# Patient Record
Sex: MALE | Race: BLACK OR AFRICAN AMERICAN | NOT HISPANIC OR LATINO | Employment: OTHER | URBAN - METROPOLITAN AREA
[De-identification: names, ages, dates, MRNs, and addresses within clinical notes are randomized per-mention and may not be internally consistent; named-entity substitution may affect disease eponyms.]

---

## 2018-03-21 PROBLEM — R21 LOCALIZED MACULAR RASH: Status: ACTIVE | Noted: 2018-03-21

## 2018-06-14 ENCOUNTER — TELEPHONE (OUTPATIENT)
Dept: ADMINISTRATIVE | Facility: HOSPITAL | Age: 68
End: 2018-06-14

## 2019-07-15 ENCOUNTER — PATIENT OUTREACH (OUTPATIENT)
Dept: ADMINISTRATIVE | Facility: HOSPITAL | Age: 69
End: 2019-07-15

## 2019-07-16 ENCOUNTER — TELEPHONE (OUTPATIENT)
Dept: ADMINISTRATIVE | Facility: HOSPITAL | Age: 69
End: 2019-07-16

## 2020-06-24 PROBLEM — H25.13 NUCLEAR SCLEROSIS, BILATERAL: Status: ACTIVE | Noted: 2020-06-24

## 2020-07-27 PROBLEM — U07.1 COVID-19: Status: ACTIVE | Noted: 2020-07-27

## 2020-07-27 PROBLEM — N17.9 AKI (ACUTE KIDNEY INJURY): Status: ACTIVE | Noted: 2020-07-27

## 2020-07-28 ENCOUNTER — NURSE TRIAGE (OUTPATIENT)
Dept: ADMINISTRATIVE | Facility: CLINIC | Age: 70
End: 2020-07-28

## 2020-07-28 NOTE — TELEPHONE ENCOUNTER
Reason for Disposition   General information question, no triage required and triager able to answer question    Protocols used: INFORMATION ONLY CALL-A-AH

## 2020-07-28 NOTE — TELEPHONE ENCOUNTER
2nd attempt  Spoke with Camille Zheng (pt's significant other) and she verified pt's identity by spelling pt's first and last names and verifying pt's . Camille states that pt is doing better denies SOB or need for medical intervention or emergency services. She states that pt's only symptom is his cough. Camille states that she and pt live in their home alone and neither one has a smart phone or knows how to access technology. Camille states that neither herself not pt knows how to receive or send a text message. Camille denies any active needs. Advised Camille to call OOC anytime for non-emergent needs and to always call 911 for emergencies; Camille voiced verbal understanding and agrees with advice. Will secure chat RITO to unenroll pt. Encounter routed to RITO and PCP.

## 2020-07-28 NOTE — TELEPHONE ENCOUNTER
Reason for Disposition   Message left on unidentified voice mail. Phone number verified.    Protocols used: NO CONTACT OR DUPLICATE CONTACT CALL-A-OH

## 2020-07-28 NOTE — TELEPHONE ENCOUNTER
Attempt 1  Attempted to reach pt enrolled in the COVID-19 Home Surveillance program to complete welcome call and orientation process via all available numbers in pt chart without success. Voice message left with specific instructions to return call at contact information provided if needing to speak to an RN for any non-emergent matters and to call 911 for any emergencies. Pt doesn't have a MyChart; unable to push messaging to pt.

## 2022-01-01 ENCOUNTER — LAB VISIT (OUTPATIENT)
Dept: LAB | Facility: HOSPITAL | Age: 72
End: 2022-01-01
Attending: NURSE PRACTITIONER
Payer: MEDICARE

## 2022-01-01 ENCOUNTER — PATIENT OUTREACH (OUTPATIENT)
Dept: ADMINISTRATIVE | Facility: CLINIC | Age: 72
End: 2022-01-01
Payer: MEDICARE

## 2022-01-01 ENCOUNTER — PATIENT MESSAGE (OUTPATIENT)
Dept: ADMINISTRATIVE | Facility: CLINIC | Age: 72
End: 2022-01-01
Payer: MEDICARE

## 2022-01-01 ENCOUNTER — HOSPITAL ENCOUNTER (EMERGENCY)
Facility: HOSPITAL | Age: 72
Discharge: HOME | End: 2022-07-08
Attending: FAMILY MEDICINE
Payer: MEDICARE

## 2022-01-01 ENCOUNTER — NURSE TRIAGE (OUTPATIENT)
Dept: ADMINISTRATIVE | Facility: CLINIC | Age: 72
End: 2022-01-01
Payer: MEDICARE

## 2022-01-01 VITALS
TEMPERATURE: 99 F | RESPIRATION RATE: 18 BRPM | WEIGHT: 136 LBS | HEART RATE: 102 BPM | SYSTOLIC BLOOD PRESSURE: 132 MMHG | DIASTOLIC BLOOD PRESSURE: 70 MMHG | BODY MASS INDEX: 16.56 KG/M2 | HEIGHT: 76 IN | OXYGEN SATURATION: 98 %

## 2022-01-01 DIAGNOSIS — I10 ESSENTIAL HYPERTENSION: ICD-10-CM

## 2022-01-01 DIAGNOSIS — R74.01 TRANSAMINITIS: ICD-10-CM

## 2022-01-01 DIAGNOSIS — N18.30 TYPE 2 DM WITH CKD STAGE 3 AND HYPERTENSION: ICD-10-CM

## 2022-01-01 DIAGNOSIS — Z79.4 TYPE 2 DIABETES MELLITUS WITH DIABETIC POLYNEUROPATHY, WITH LONG-TERM CURRENT USE OF INSULIN: ICD-10-CM

## 2022-01-01 DIAGNOSIS — R05.9 COUGH: ICD-10-CM

## 2022-01-01 DIAGNOSIS — J90 PLEURAL EFFUSION, RIGHT: Primary | ICD-10-CM

## 2022-01-01 DIAGNOSIS — E11.42 TYPE 2 DIABETES MELLITUS WITH DIABETIC POLYNEUROPATHY, WITH LONG-TERM CURRENT USE OF INSULIN: ICD-10-CM

## 2022-01-01 DIAGNOSIS — E11.22 TYPE 2 DM WITH CKD STAGE 3 AND HYPERTENSION: ICD-10-CM

## 2022-01-01 DIAGNOSIS — I12.9 TYPE 2 DM WITH CKD STAGE 3 AND HYPERTENSION: ICD-10-CM

## 2022-01-01 LAB
ALBUMIN SERPL BCP-MCNC: 4.2 G/DL (ref 3.5–5.2)
ALBUMIN SERPL BCP-MCNC: 4.3 G/DL (ref 3.5–5.2)
ALP SERPL-CCNC: 257 U/L (ref 38–126)
ALP SERPL-CCNC: 77 U/L (ref 38–126)
ALT SERPL W/O P-5'-P-CCNC: 205 U/L (ref 10–44)
ALT SERPL W/O P-5'-P-CCNC: 34 U/L (ref 10–44)
ANION GAP SERPL CALC-SCNC: 12 MMOL/L (ref 8–16)
ANION GAP SERPL CALC-SCNC: 9 MMOL/L (ref 8–16)
AST SERPL-CCNC: 176 U/L (ref 15–46)
AST SERPL-CCNC: 34 U/L (ref 15–46)
BACTERIA BLD CULT: NORMAL
BACTERIA BLD CULT: NORMAL
BASOPHILS # BLD AUTO: 0.08 K/UL (ref 0–0.2)
BASOPHILS NFR BLD: 1 % (ref 0–1.9)
BILIRUB SERPL-MCNC: 0.5 MG/DL (ref 0.1–1)
BILIRUB SERPL-MCNC: 0.7 MG/DL (ref 0.1–1)
CALCIUM SERPL-MCNC: 9.3 MG/DL (ref 8.7–10.5)
CALCIUM SERPL-MCNC: 9.4 MG/DL (ref 8.7–10.5)
CHLORIDE SERPL-SCNC: 103 MMOL/L (ref 95–110)
CHLORIDE SERPL-SCNC: 107 MMOL/L (ref 95–110)
CO2 SERPL-SCNC: 24 MMOL/L (ref 23–29)
CO2 SERPL-SCNC: 26 MMOL/L (ref 23–29)
CREAT SERPL-MCNC: 1.26 MG/DL (ref 0.5–1.4)
CREAT SERPL-MCNC: 1.26 MG/DL (ref 0.5–1.4)
DIFFERENTIAL METHOD: ABNORMAL
EOSINOPHIL # BLD AUTO: 0.2 K/UL (ref 0–0.5)
EOSINOPHIL NFR BLD: 2 % (ref 0–8)
ERYTHROCYTE [DISTWIDTH] IN BLOOD BY AUTOMATED COUNT: 13 % (ref 11.5–14.5)
EST. GFR  (AFRICAN AMERICAN): >60 ML/MIN/1.73 M^2
EST. GFR  (AFRICAN AMERICAN): >60 ML/MIN/1.73 M^2
EST. GFR  (NON AFRICAN AMERICAN): 57.4 ML/MIN/1.73 M^2
EST. GFR  (NON AFRICAN AMERICAN): 57.4 ML/MIN/1.73 M^2
ESTIMATED AVG GLUCOSE: 217 MG/DL (ref 68–131)
GLUCOSE SERPL-MCNC: 138 MG/DL (ref 70–110)
GLUCOSE SERPL-MCNC: 192 MG/DL (ref 70–110)
HAV IGM SERPL QL IA: NEGATIVE
HBA1C MFR BLD: 9.2 % (ref 4–5.6)
HBV CORE IGM SERPL QL IA: NEGATIVE
HBV SURFACE AG SERPL QL IA: NEGATIVE
HCT VFR BLD AUTO: 34.8 % (ref 40–54)
HCV AB SERPL QL IA: NEGATIVE
HGB BLD-MCNC: 10.7 G/DL (ref 14–18)
IMM GRANULOCYTES # BLD AUTO: 0.03 K/UL (ref 0–0.04)
IMM GRANULOCYTES NFR BLD AUTO: 0.4 % (ref 0–0.5)
INFLUENZA A, MOLECULAR: NEGATIVE
INFLUENZA B, MOLECULAR: NEGATIVE
LYMPHOCYTES # BLD AUTO: 1.2 K/UL (ref 1–4.8)
LYMPHOCYTES NFR BLD: 15 % (ref 18–48)
MCH RBC QN AUTO: 27.5 PG (ref 27–31)
MCHC RBC AUTO-ENTMCNC: 30.7 G/DL (ref 32–36)
MCV RBC AUTO: 90 FL (ref 82–98)
MONOCYTES # BLD AUTO: 0.6 K/UL (ref 0.3–1)
MONOCYTES NFR BLD: 7.6 % (ref 4–15)
NEUTROPHILS # BLD AUTO: 5.8 K/UL (ref 1.8–7.7)
NEUTROPHILS NFR BLD: 74 % (ref 38–73)
NRBC BLD-RTO: 0 /100 WBC
PLATELET # BLD AUTO: 344 K/UL (ref 150–450)
PMV BLD AUTO: 9.8 FL (ref 9.2–12.9)
POTASSIUM SERPL-SCNC: 4.6 MMOL/L (ref 3.5–5.1)
POTASSIUM SERPL-SCNC: 4.8 MMOL/L (ref 3.5–5.1)
PROT SERPL-MCNC: 7.4 G/DL (ref 6–8.4)
PROT SERPL-MCNC: 7.9 G/DL (ref 6–8.4)
RBC # BLD AUTO: 3.89 M/UL (ref 4.6–6.2)
SARS-COV-2 RDRP RESP QL NAA+PROBE: NEGATIVE
SODIUM SERPL-SCNC: 138 MMOL/L (ref 136–145)
SODIUM SERPL-SCNC: 143 MMOL/L (ref 136–145)
SPECIMEN SOURCE: NORMAL
UUN UR-MCNC: 17 MG/DL (ref 2–20)
UUN UR-MCNC: 23 MG/DL (ref 2–20)
WBC # BLD AUTO: 7.81 K/UL (ref 3.9–12.7)

## 2022-01-01 PROCEDURE — 83036 HEMOGLOBIN GLYCOSYLATED A1C: CPT | Performed by: NURSE PRACTITIONER

## 2022-01-01 PROCEDURE — 87502 INFLUENZA DNA AMP PROBE: CPT | Mod: ER | Performed by: FAMILY MEDICINE

## 2022-01-01 PROCEDURE — 85025 COMPLETE CBC W/AUTO DIFF WBC: CPT | Mod: ER | Performed by: FAMILY MEDICINE

## 2022-01-01 PROCEDURE — U0002 COVID-19 LAB TEST NON-CDC: HCPCS | Mod: ER | Performed by: FAMILY MEDICINE

## 2022-01-01 PROCEDURE — 25000003 PHARM REV CODE 250: Mod: ER | Performed by: FAMILY MEDICINE

## 2022-01-01 PROCEDURE — 36415 COLL VENOUS BLD VENIPUNCTURE: CPT | Mod: PO | Performed by: NURSE PRACTITIONER

## 2022-01-01 PROCEDURE — 63600175 PHARM REV CODE 636 W HCPCS: Mod: ER | Performed by: FAMILY MEDICINE

## 2022-01-01 PROCEDURE — 87040 BLOOD CULTURE FOR BACTERIA: CPT | Mod: ER | Performed by: FAMILY MEDICINE

## 2022-01-01 PROCEDURE — 80053 COMPREHEN METABOLIC PANEL: CPT | Mod: PO | Performed by: NURSE PRACTITIONER

## 2022-01-01 PROCEDURE — 96365 THER/PROPH/DIAG IV INF INIT: CPT | Mod: ER

## 2022-01-01 PROCEDURE — 80074 ACUTE HEPATITIS PANEL: CPT | Mod: ER | Performed by: EMERGENCY MEDICINE

## 2022-01-01 PROCEDURE — 80053 COMPREHEN METABOLIC PANEL: CPT | Mod: ER | Performed by: FAMILY MEDICINE

## 2022-01-01 PROCEDURE — 99284 EMERGENCY DEPT VISIT MOD MDM: CPT | Mod: 25,ER

## 2022-01-01 PROCEDURE — 25000003 PHARM REV CODE 250: Mod: ER | Performed by: EMERGENCY MEDICINE

## 2022-01-01 RX ORDER — TRAMADOL HYDROCHLORIDE 50 MG/1
50 TABLET ORAL
Status: COMPLETED | OUTPATIENT
Start: 2022-01-01 | End: 2022-01-01

## 2022-01-01 RX ORDER — TRAMADOL HYDROCHLORIDE 50 MG/1
50 TABLET ORAL EVERY 6 HOURS PRN
Qty: 12 TABLET | Refills: 0 | Status: SHIPPED | OUTPATIENT
Start: 2022-01-01 | End: 2022-01-01

## 2022-01-01 RX ORDER — AZITHROMYCIN 250 MG/1
250 TABLET, FILM COATED ORAL DAILY
Qty: 6 TABLET | Refills: 0 | Status: SHIPPED | OUTPATIENT
Start: 2022-01-01 | End: 2022-01-01

## 2022-01-01 RX ADMIN — TRAMADOL HYDROCHLORIDE 50 MG: 50 TABLET, COATED ORAL at 07:07

## 2022-01-01 RX ADMIN — PIPERACILLIN AND TAZOBACTAM 4.5 G: 4; .5 INJECTION, POWDER, LYOPHILIZED, FOR SOLUTION INTRAVENOUS; PARENTERAL at 07:07

## 2022-07-08 NOTE — ED NOTES
"Pt presents to ED with C/O cough and rib pain with deep breaths. Pt states " I hurt all the time." Spouse reports pt gets SOB with ambulation. Pt states pain is 10/10.  "

## 2022-07-08 NOTE — ED PROVIDER NOTES
"Encounter Date: 2022       History     Chief Complaint   Patient presents with    Cough     Pt reports cough and pain with deep breathing. Spouse reports pt SOB with walking. Symptoms started for "quite some time." Denies chest pain. No meds.      71-year-old male presents to ER with chronic cough more than 3 months.  Also complains of diffuse back pain for last 3 months.  Patient claims he has been losing weight and was seen by his PCP and had CT of lungs recently.  And is pending pulmonary workup.  Patient also had EGD yesterday.  Started on PPI.  History of chronic alcoholism..  Hypertension, hyperlipidemia, diabetes type 2, arthritis.    The history is provided by the patient.     Review of patient's allergies indicates:  No Known Allergies  Past Medical History:   Diagnosis Date    ATIF (acute kidney injury) 2020    Arthritis     Diabetes mellitus, type 2     Erectile dysfunction     Hyperlipidemia     Hypertension     Joint pain      Past Surgical History:   Procedure Laterality Date    CARDIAC CATHETERIZATION      ESOPHAGOGASTRODUODENOSCOPY N/A 2022    Procedure: EGD (ESOPHAGOGASTRODUODENOSCOPY);  Surgeon: Ranjit Cohen MD;  Location: [facility];  Service: Endoscopy;  Laterality: N/A;    SHOULDER SURGERY Right      Family History   Problem Relation Age of Onset    Cancer Mother         throat    No Known Problems Father     No Known Problems Sister     No Known Problems Brother     No Known Problems Sister      Social History     Tobacco Use    Smoking status: Former Smoker     Packs/day: 0.50     Years: 50.00     Pack years: 25.00     Types: Cigarettes     Start date:      Quit date: 2008     Years since quittin.8    Smokeless tobacco: Never Used    Tobacco comment: quit    Substance Use Topics    Alcohol use: Not Currently     Alcohol/week: 1.0 standard drink     Types: 1 Cans of beer per week     Comment: 1 beer occ    Drug use: No     Review of Systems "   Constitutional: Negative for activity change, appetite change, chills and fever.   HENT: Negative for congestion and sore throat.    Eyes: Negative for discharge and itching.   Respiratory: Positive for cough and shortness of breath. Negative for chest tightness and wheezing.    Cardiovascular: Negative for chest pain and leg swelling.   Gastrointestinal: Negative for abdominal pain, nausea and vomiting.   Genitourinary: Negative for dysuria and flank pain.   Musculoskeletal: Negative for back pain.   Skin: Negative for rash.   Neurological: Negative for dizziness, speech difficulty, weakness, light-headedness, numbness and headaches.   Hematological: Does not bruise/bleed easily.   All other systems reviewed and are negative.      Physical Exam     Initial Vitals [07/08/22 1612]   BP Pulse Resp Temp SpO2   112/68 109 17 99.3 °F (37.4 °C) 97 %      MAP       --         Physical Exam    Nursing note and vitals reviewed.  Constitutional: Vital signs are normal. He appears well-developed and well-nourished. He is active. No distress.   HENT:   Head: Normocephalic.   Nose: Nose normal.   Mouth/Throat: Oropharynx is clear and moist and mucous membranes are normal.   Eyes: Conjunctivae, EOM and lids are normal. Pupils are equal, round, and reactive to light.   Neck: Neck supple.   Normal range of motion.  Cardiovascular: Normal rate, regular rhythm, S1 normal, S2 normal and normal heart sounds.   Pulmonary/Chest: No respiratory distress. He has decreased breath sounds in the right lower field. He has no wheezes. He has no rhonchi. He has rales. He exhibits no tenderness.   Abdominal: Abdomen is soft. Bowel sounds are normal. He exhibits no distension. There is no abdominal tenderness. There is no rebound and no guarding.   Musculoskeletal:         General: Normal range of motion.      Right upper arm: Normal.      Left upper arm: Normal.      Cervical back: Normal range of motion and neck supple.      Right lower leg:  Normal.      Left lower leg: Normal.     Neurological: He is alert and oriented to person, place, and time. He has normal strength. GCS score is 15. GCS eye subscore is 4. GCS verbal subscore is 5. GCS motor subscore is 6.   Skin: Skin is warm. Capillary refill takes less than 2 seconds.   Psychiatric: He has a normal mood and affect. His speech is normal and behavior is normal. Thought content normal. Cognition and memory are normal.         ED Course   Procedures  Labs Reviewed   CBC W/ AUTO DIFFERENTIAL - Abnormal; Notable for the following components:       Result Value    RBC 3.89 (*)     Hemoglobin 10.7 (*)     Hematocrit 34.8 (*)     MCHC 30.7 (*)     Gran % 74.0 (*)     Lymph % 15.0 (*)     All other components within normal limits   COMPREHENSIVE METABOLIC PANEL - Abnormal; Notable for the following components:    Glucose 138 (*)     Alkaline Phosphatase 257 (*)      (*)      (*)     eGFR if non  57.4 (*)     All other components within normal limits   INFLUENZA A & B BY MOLECULAR   CULTURE, BLOOD   CULTURE, BLOOD   SARS-COV-2 RNA AMPLIFICATION, QUAL    Narrative:     Is the patient symptomatic?->Yes   HEPATITIS PANEL, ACUTE          Imaging Results          X-Ray Chest PA And Lateral (Final result)  Result time 07/08/22 17:10:49    Final result by Jus Kline MD (07/08/22 17:10:49)                 Impression:      Moderate opacification of the right lung base consistent with right basilar pneumonia with likely associated right-sided pleural effusion      Electronically signed by: Eliud Luu  Date:    07/08/2022  Time:    17:10             Narrative:    EXAMINATION:  XR CHEST PA AND LATERAL    CLINICAL HISTORY:  Cough, unspecified    TECHNIQUE:  PA and lateral views of the chest were performed.    COMPARISON:  None    FINDINGS:  Moderate opacification of right lung base consistent with left basilar pneumonia with associated left basilar pleural effusion.  Calcification of  the pancreas suspected.  No acute osseous injury.  Cardiac silhouette within normal limits.                                 Medications   piperacillin-tazobactam 4.5 g in dextrose 5 % 100 mL IVPB (ready to mix system) (0 g Intravenous Stopped 7/8/22 1957)   traMADoL tablet 50 mg (50 mg Oral Given 7/8/22 1950)     Medical Decision Making:   Initial Assessment:   71-year-old male presents to ER with chronic cough, chronic back pain.  Recent evaluation of pleural effusion-by PCP pending pulmonary appointment.  Denies fever.  Exertion or shortness of breath.  Pleurisy  Differential Diagnosis:   Pleural effusion, benign versus malignant, pneumonia, asbestosis/mesothelioma  Clinical Tests:   Lab Tests: Ordered and Reviewed  Radiological Study: Ordered and Reviewed  Medical Tests: Ordered and Reviewed  ED Management:  Review of CT with right pleural effusion with flakes concern for asbestosis.  X-ray consistent with CT findings.  Pending lab workup patient is turned over to Dr. Medrano at shift change.                      Clinical Impression:   Final diagnoses:  [R05.9] Cough  [J90] Pleural effusion, right (Primary)  [R74.01] Transaminitis          ED Disposition Condition    Discharge Stable        ED Prescriptions     Medication Sig Dispense Start Date End Date Auth. Provider    azithromycin (Z-FALLON) 250 MG tablet Take 1 tablet (250 mg total) by mouth once daily. Take first 2 tablets together, then 1 every day until finished. for 10 days 6 tablet 7/8/2022 7/18/2022 Horace Medrano MD    traMADoL (ULTRAM) 50 mg tablet Take 1 tablet (50 mg total) by mouth every 6 (six) hours as needed for Pain. 12 tablet 7/8/2022 7/11/2022 Horace Medrano MD        Follow-up Information     Follow up With Specialties Details Why Contact Info    Yariel Gudino MD Pulmonary Disease, Critical Care Medicine Go on 7/14/2022 As scheduled, for reassessment [address]  [place] [address]  [phone]      [facility]  Emergency Dept Emergency Medicine Go to  As needed, If symptoms worsen [address] [facility] [address] [phone]    PCP  Schedule an appointment as soon as possible for a visit  reassessment of elevated liver enzymes/consideration for specialty referral            Sushant Browning MD  07/09/22 0600

## 2022-07-09 NOTE — ED PROVIDER NOTES
"The patient was received from the off-going emergency room physician Dr Browning at 6:00PM.  All pertinent details presently available from the patient encounter were discussed along with the expected plan for disposition.        Vitals:    07/08/22 1612 07/08/22 1851 07/08/22 1950 07/08/22 2002   BP: 112/68 131/68  132/70   Pulse: 109 101  102   Resp: 17  18    Temp: 99.3 °F (37.4 °C)      SpO2: 97% 99%  98%   Weight: 61.7 kg (136 lb)      Height: 6' 4" (1.93 m)           Latest Reference Range & Units 07/08/22 16:44 07/08/22 18:43   WBC 3.90 - 12.70 K/uL  7.81   RBC 4.60 - 6.20 M/uL  3.89 (L)   Hemoglobin 14.0 - 18.0 g/dL  10.7 (L)   Hematocrit 40.0 - 54.0 %  34.8 (L)   MCV 82 - 98 fL  90   MCH 27.0 - 31.0 pg  27.5   MCHC 32.0 - 36.0 g/dL  30.7 (L)   RDW 11.5 - 14.5 %  13.0   Platelets 150 - 450 K/uL  344   MPV 9.2 - 12.9 fL  9.8   Gran % 38.0 - 73.0 %  74.0 (H)   Lymph % 18.0 - 48.0 %  15.0 (L)   Mono % 4.0 - 15.0 %  7.6   Eosinophil % 0.0 - 8.0 %  2.0   Basophil % 0.0 - 1.9 %  1.0   Immature Granulocytes 0.0 - 0.5 %  0.4   Gran # (ANC) 1.8 - 7.7 K/uL  5.8   Lymph # 1.0 - 4.8 K/uL  1.2   Mono # 0.3 - 1.0 K/uL  0.6   Eos # 0.0 - 0.5 K/uL  0.2   Baso # 0.00 - 0.20 K/uL  0.08   Immature Grans (Abs) 0.00 - 0.04 K/uL  0.03   nRBC 0 /100 WBC  0   Differential Method   Automated   Sodium 136 - 145 mmol/L  138   Potassium 3.5 - 5.1 mmol/L  4.6   Chloride 95 - 110 mmol/L  103   CO2 23 - 29 mmol/L  26   Anion Gap 8 - 16 mmol/L  9   BUN 2 - 20 mg/dL  17   Creatinine 0.50 - 1.40 mg/dL  1.26   eGFR if non African American >60 mL/min/1.73 m^2  57.4 !   eGFR if African American >60 mL/min/1.73 m^2  >60.0   Glucose 70 - 110 mg/dL  138 (H)   Calcium 8.7 - 10.5 mg/dL  9.3   Alkaline Phosphatase 38 - 126 U/L  257 (H)   PROTEIN TOTAL 6.0 - 8.4 g/dL  7.9   Albumin 3.5 - 5.2 g/dL  4.2   BILIRUBIN TOTAL 0.1 - 1.0 mg/dL  0.7   AST 15 - 46 U/L  176 (H)   ALT 10 - 44 U/L  205 (H)   Flu A & B Source  NP    Influenza A, Molecular Negative  " Negative    Influenza B, Molecular Negative  Negative    SARS-CoV-2 RNA, Amplification, Qual Negative  Negative    (L): Data is abnormally low  (H): Data is abnormally high  !: Data is abnormal        EXAMINATION:  XR CHEST PA AND LATERAL     CLINICAL HISTORY:  Cough, unspecified     TECHNIQUE:  PA and lateral views of the chest were performed.     COMPARISON:  None     FINDINGS:  Moderate opacification of right lung base consistent with left basilar pneumonia with associated left basilar pleural effusion.  Calcification of the pancreas suspected.  No acute osseous injury.  Cardiac silhouette within normal limits.     Impression:     Moderate opacification of the right lung base consistent with right basilar pneumonia with likely associated right-sided pleural effusion        Electronically signed by: Eliud Luu  Date:                                            07/08/2022  Time:                                           17:10      All findings were reviewed with the patient/family in detail.  Patient will be started on a course of empiric antibiotics although I think the likelihood of an infectious source is fairly low.  Patient has a pulmonology appointment scheduled for the 14th.  Patient is not showing any signs of respiratory compromise at present so we will defer thoracentesis until that time.  I see no indication of an emergent process beyond that addressed during our encounter but have duly counseled the patient/family regarding the need for prompt follow-up as well as the indications that should prompt immediate return to the emergency room should new or worrisome developments occur.  The patient has additionally been provided with printed information regarding diagnosis as well as instructions regarding follow up and any medications intended to manage the patient's aforementioned conditions.  The patient/family communicates understanding of all this information and all remaining questions and concerns  were addressed at this time.           Horace Medrano MD  07/08/22 2805

## 2022-08-01 PROBLEM — J90 PLEURAL EFFUSION: Status: ACTIVE | Noted: 2022-01-01

## 2022-08-01 PROBLEM — R91.8 PULMONARY NODULES: Status: ACTIVE | Noted: 2022-01-01

## 2022-08-01 PROBLEM — R79.89 ABNORMAL LFTS: Status: ACTIVE | Noted: 2022-01-01

## 2022-08-01 PROBLEM — D64.9 ANEMIA: Status: ACTIVE | Noted: 2022-01-01

## 2022-08-01 PROBLEM — E46 MALNUTRITION: Status: ACTIVE | Noted: 2022-01-01

## 2022-08-02 PROBLEM — J98.19 TRAPPED LUNG: Status: ACTIVE | Noted: 2022-01-01

## 2022-08-08 PROBLEM — N17.9 AKI (ACUTE KIDNEY INJURY): Status: RESOLVED | Noted: 2020-07-27 | Resolved: 2022-01-01

## 2022-08-11 PROBLEM — E11.10 DIABETIC KETOACIDOSIS WITHOUT COMA ASSOCIATED WITH TYPE 2 DIABETES MELLITUS: Status: ACTIVE | Noted: 2022-01-01

## 2022-08-11 NOTE — PROGRESS NOTES
C3 nurse spoke with Frank Subramanian(wife) for a TCC post hospital discharge follow up call. Inbasket msg received w/patient (wife) req callback on concerns for increased SOB. Patient(wife) transferred to Triage for further assistance. TCC call not completed. The patient has a scheduled HOSFU appointment with Kate Sanchez MD on 8/17/22 @ 2763.

## 2022-08-11 NOTE — PROGRESS NOTES
C3 nurse attempted to contact Frank Subramanian for a TCC post hospital discharge follow up call. No answer. Left voicemail with callback information. The patient has a scheduled HOSFU appointment with ANTHONY HARDIN on 8/17/22 @ 8568.

## 2022-08-11 NOTE — TELEPHONE ENCOUNTER
Camille calling on behalf of patient who is present. Reports patient is feeling horrible. He is not eating or drinking and is very fatigued. Increased SOB. Advised, per protocol. Verbalizes understanding.     Reason for Disposition   Difficult to awaken or acting confused (e.g., disoriented, slurred speech)    Protocols used: OXYGEN MONITORING AND SGOSPMC-V-MD

## 2022-08-12 PROBLEM — A41.9 SEPSIS: Status: ACTIVE | Noted: 2022-01-01
